# Patient Record
Sex: FEMALE | Race: WHITE | NOT HISPANIC OR LATINO | URBAN - METROPOLITAN AREA
[De-identification: names, ages, dates, MRNs, and addresses within clinical notes are randomized per-mention and may not be internally consistent; named-entity substitution may affect disease eponyms.]

---
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
## 2021-05-31 VITALS — BODY MASS INDEX: 29.41 KG/M2 | WEIGHT: 176.5 LBS | HEIGHT: 65 IN

## 2021-06-11 NOTE — PROGRESS NOTES
Assessment/Plan:         1. Depression  Ambulatory referral to Psychology     Unclear if this is a true depression, anxiety, or personality trait. I believe that she would be best served first by finding a counselor whom she can talk to as she was resistant in opening up to discuss what she is feeling. I had a hard time getting her to tell me what was going on and why she came into clinic today. I don't think starting a medication is the best first step for her. However, she may eventually benefit from seeing a psychiatrist for evaluation if a diagnosis is needed regarding any future assistance if this is something that she will need. I would like her get established with a counselor first and follow up with myself if further concerns or need for referrals/ medication is needed. The patient agrees with this.        Subjective:      Bharti Fairbanks is a 17 y.o. female who presents for Concerns of depression.  She comes in today with her mom. She is quite shy and reserved when it comes to talking about why she is here and prefers her mom to share some of the observations that she has had.  She expresses concerns regarding to express how she feels with her friends.  She also reports that she has symptoms of depression as well as anxiety.  She has a hard time expressing how she feels with her friend as well as with her family.  Her mom recalls that when she was in elementary school she had an IEP as well as working with a psychologist as she was having perfectionism and anger issues.  She also worked with the  to help with emotional growth grade.  No specific diagnosis was ever given to her however she notices these difficulties within her self.  She wants to do well in school however finds that she procrastinates more than she should. She has Increased anxiety about school work and more than what would be expected as normal. She expresses a  reluctance to talk to teachers due to anxiety. She also brings up  avoiding going to bed until around midnight for the sole reason to stay up. She will stay awake without purpose and does not feel like anxiety is keeping her awake. She is unable to quantify why she felt this way.   She is feeling socially awkard and having difficulty with making new friends.  She is also wanting to learn how to deal with emotions. She also wants to get better at putting herself out there. She enjoys being by herself. 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     The following portions of the patient's history were reviewed and updated as appropriate: allergies, current medications, past family history, past medical history, past social history, past surgical history and problem list.    Past Medical History:   Diagnosis Date     Concussion 2004     Past Surgical History:   Procedure Laterality Date     TYMPANOSTOMY TUBE PLACEMENT       Social History     Social History     Marital status: Single     Spouse name: N/A     Number of children: N/A     Years of education: N/A     Occupational History     Not on file.     Social History Main Topics     Smoking status: Never Smoker     Smokeless tobacco: Not on file     Alcohol use No     Drug use: No     Sexual activity: Not on file     Other Topics Concern     Not on file     Social History Narrative    Lives with:    Mom: Sophia    Dad: Deepak     Family History   Problem Relation Age of Onset     Asthma Mother      Diabetes Father      Sleep apnea Father      Depression Father      Anxiety disorder Father      Hypertension Father      Anxiety disorder Brother      Depression Brother      ADD / ADHD Brother      Stroke Maternal Grandmother      Diabetes Maternal Grandmother      Sleep apnea Maternal Grandmother      Cancer Maternal Grandmother      skin     Cancer Maternal Grandfather      Heart disease Paternal Grandfather        Review of Systems   Pertinent items are noted in HPI.  A  "12 point comprehensive review of systems was negative except as noted.      Objective:      /72 (Patient Site: Left Arm, Patient Position: Sitting, Cuff Size: Adult Regular)  Pulse 80  Resp 20  Ht 5' 4.9\" (1.648 m)  Wt 176 lb 8 oz (80.1 kg)  LMP  (LMP Unknown)  Breastfeeding? No  BMI 29.46 kg/m2    General appearance: alert, appears stated age and cooperative  Lungs: clear to auscultation bilaterally  Heart: regular rate and rhythm, S1, S2 normal, no murmur, click, rub or gallop  Neurologic: Grossly normal  Psych: Speech is reserved but fluent and thought process is linear, affect is shy, introverted, reactive and appropriate    "

## 2021-07-21 ENCOUNTER — LAB REQUISITION (OUTPATIENT)
Dept: LAB | Facility: CLINIC | Age: 21
End: 2021-07-21

## 2021-07-21 DIAGNOSIS — Z30.011 ENCOUNTER FOR INITIAL PRESCRIPTION OF CONTRACEPTIVE PILLS: ICD-10-CM

## 2021-07-21 PROCEDURE — 87491 CHLMYD TRACH DNA AMP PROBE: CPT | Mod: ORL | Performed by: PEDIATRICS

## 2021-07-23 LAB
C TRACH DNA SPEC QL PROBE+SIG AMP: NEGATIVE
N GONORRHOEA DNA SPEC QL NAA+PROBE: NEGATIVE